# Patient Record
Sex: MALE | Race: WHITE | NOT HISPANIC OR LATINO | Employment: FULL TIME | URBAN - NONMETROPOLITAN AREA
[De-identification: names, ages, dates, MRNs, and addresses within clinical notes are randomized per-mention and may not be internally consistent; named-entity substitution may affect disease eponyms.]

---

## 2023-04-10 ENCOUNTER — HOSPITAL ENCOUNTER (EMERGENCY)
Facility: HOSPITAL | Age: 48
Discharge: HOME | End: 2023-04-10

## 2023-04-10 VITALS
WEIGHT: 146 LBS | SYSTOLIC BLOOD PRESSURE: 147 MMHG | TEMPERATURE: 97 F | RESPIRATION RATE: 18 BRPM | OXYGEN SATURATION: 100 % | HEIGHT: 69 IN | BODY MASS INDEX: 21.62 KG/M2 | HEART RATE: 61 BPM | DIASTOLIC BLOOD PRESSURE: 86 MMHG

## 2023-04-10 DIAGNOSIS — L23.7 ALLERGIC CONTACT DERMATITIS DUE TO PLANTS, EXCEPT FOOD: Primary | ICD-10-CM

## 2023-04-10 PROCEDURE — 99284 EMERGENCY DEPT VISIT MOD MDM: CPT

## 2023-04-10 RX ORDER — METHYLPREDNISOLONE 4 MG/1
TABLET ORAL
Qty: 21 EACH | Refills: 0 | Status: SHIPPED | OUTPATIENT
Start: 2023-04-10 | End: 2023-05-01

## 2023-04-10 RX ORDER — HYDROXYZINE HYDROCHLORIDE 25 MG/1
25 TABLET, FILM COATED ORAL EVERY 6 HOURS
Qty: 12 TABLET | Refills: 0 | Status: SHIPPED | OUTPATIENT
Start: 2023-04-10

## 2023-04-10 NOTE — ED PROVIDER NOTES
Encounter Date: 4/10/2023       History     Chief Complaint   Patient presents with    Rash     AMB TO ED WITH C/O RED RASH/BLISTERS TO RIGHT ARM. PT WAS UNDER  A HOUSE ON Friday. HE DEVELOPED THE RASH SHORTLY AFTERWARD. THE RASH IS NOW SWOLLEN AND STARTING TO BLISTER. HE THINKS THAT HE WAS IN CONTACT WITH SOME POISON IVY.     RASH ON UPPER EXTREMITIES X 3 DAYS WITH REDNESS AND OOZING AFTER EXPOSURE TO UNKNOWN PLANTS    Review of patient's allergies indicates:  No Known Allergies  History reviewed. No pertinent past medical history.  History reviewed. No pertinent surgical history.  History reviewed. No pertinent family history.  Social History     Tobacco Use    Smoking status: Every Day     Packs/day: 1.00     Types: Cigarettes    Smokeless tobacco: Never   Substance Use Topics    Alcohol use: Yes     Alcohol/week: 12.0 standard drinks     Types: 12 Cans of beer per week    Drug use: Never     Review of Systems   Skin:  Positive for rash.   All other systems reviewed and are negative.    Physical Exam     Initial Vitals [04/10/23 1345]   BP Pulse Resp Temp SpO2   (!) 149/90 66 20 97.4 °F (36.3 °C) 99 %      MAP       --         Physical Exam    Nursing note and vitals reviewed.  Constitutional: He appears well-developed and well-nourished.   HENT:   Head: Normocephalic and atraumatic.   Mouth/Throat: Mucous membranes are normal.   Eyes: EOM are normal. Pupils are equal, round, and reactive to light.   Neck: Neck supple.   Normal range of motion.  Cardiovascular:  Normal rate, regular rhythm and normal heart sounds.           Pulmonary/Chest: Breath sounds normal.   Musculoskeletal:         General: Normal range of motion.      Cervical back: Normal range of motion and neck supple.     Neurological: He is alert and oriented to person, place, and time. He has normal strength.   Skin: Skin is warm and dry. Capillary refill takes less than 2 seconds.   MODERATE VESICLES ON BILATERAL UPPER EXTREMITIES WITH CRUSTING AND  ERYTHEMA   Psychiatric: He has a normal mood and affect. His behavior is normal. Judgment and thought content normal.       ED Course   Procedures  Labs Reviewed - No data to display       Imaging Results    None          Medications - No data to display                           Clinical Impression:   Final diagnoses:  [L23.7] Allergic contact dermatitis due to plants, except food (Primary)        ED Disposition Condition    Discharge Stable          ED Prescriptions       Medication Sig Dispense Start Date End Date Auth. Provider    methylPREDNISolone (MEDROL DOSEPACK) 4 mg tablet use as directed 21 each 4/10/2023 5/1/2023 HONORIO Wells    hydrOXYzine HCL (ATARAX) 25 MG tablet Take 1 tablet (25 mg total) by mouth every 6 (six) hours. 12 tablet 4/10/2023 -- HONORIO Wells          Follow-up Information       Follow up With Specialties Details Why Contact Info    Ray Ignacio MD Psychiatry  As needed [address] [place] [address]  [phone]               HONORIO Wells  04/10/23 8843

## 2023-07-19 ENCOUNTER — HOSPITAL ENCOUNTER (EMERGENCY)
Facility: HOSPITAL | Age: 48
Discharge: HOME | End: 2023-07-19
Attending: FAMILY MEDICINE

## 2023-07-19 VITALS
DIASTOLIC BLOOD PRESSURE: 79 MMHG | SYSTOLIC BLOOD PRESSURE: 127 MMHG | BODY MASS INDEX: 21.47 KG/M2 | HEART RATE: 64 BPM | RESPIRATION RATE: 18 BRPM | OXYGEN SATURATION: 100 % | TEMPERATURE: 98 F | WEIGHT: 150 LBS | HEIGHT: 70 IN

## 2023-07-19 DIAGNOSIS — M70.42 PREPATELLAR BURSITIS OF LEFT KNEE: ICD-10-CM

## 2023-07-19 DIAGNOSIS — S80.02XA CONTUSION OF LEFT KNEE, INITIAL ENCOUNTER: Primary | ICD-10-CM

## 2023-07-19 DIAGNOSIS — L03.116 CELLULITIS OF LEFT KNEE: ICD-10-CM

## 2023-07-19 PROCEDURE — 99284 EMERGENCY DEPT VISIT MOD MDM: CPT

## 2023-07-19 PROCEDURE — 63600175 PHARM REV CODE 636 W HCPCS: Performed by: NURSE PRACTITIONER

## 2023-07-19 PROCEDURE — 96372 THER/PROPH/DIAG INJ SC/IM: CPT | Performed by: NURSE PRACTITIONER

## 2023-07-19 PROCEDURE — 25000003 PHARM REV CODE 250: Performed by: NURSE PRACTITIONER

## 2023-07-19 RX ORDER — MELOXICAM 15 MG/1
15 TABLET ORAL DAILY
Qty: 20 TABLET | Refills: 0 | Status: SHIPPED | OUTPATIENT
Start: 2023-07-19

## 2023-07-19 RX ORDER — CLINDAMYCIN HYDROCHLORIDE 150 MG/1
600 CAPSULE ORAL
Status: COMPLETED | OUTPATIENT
Start: 2023-07-19 | End: 2023-07-19

## 2023-07-19 RX ORDER — CLINDAMYCIN HYDROCHLORIDE 300 MG/1
300 CAPSULE ORAL EVERY 8 HOURS
Qty: 30 CAPSULE | Refills: 0 | Status: SHIPPED | OUTPATIENT
Start: 2023-07-19

## 2023-07-19 RX ORDER — KETOROLAC TROMETHAMINE 30 MG/ML
60 INJECTION, SOLUTION INTRAMUSCULAR; INTRAVENOUS
Status: COMPLETED | OUTPATIENT
Start: 2023-07-19 | End: 2023-07-19

## 2023-07-19 RX ADMIN — KETOROLAC TROMETHAMINE 60 MG: 30 INJECTION, SOLUTION INTRAMUSCULAR; INTRAVENOUS at 12:07

## 2023-07-19 RX ADMIN — CLINDAMYCIN HYDROCHLORIDE 600 MG: 150 CAPSULE ORAL at 12:07

## 2023-07-19 NOTE — ED PROVIDER NOTES
Encounter Date: 7/19/2023       History     Chief Complaint   Patient presents with    Knee Injury     Pt c/o left knee pain x4 days, reports he hit the side of knee on a boat trailer 4 days ago and pain is worse.  Pt has ace wrap to knee and crutches     Sree presents with left knee pain for 4 days after hitting his knee on a boat.       The history is provided by the patient.   Review of patient's allergies indicates:  No Known Allergies  History reviewed. No pertinent past medical history.  History reviewed. No pertinent surgical history.  No family history on file.  Social History     Tobacco Use    Smoking status: Every Day     Packs/day: 1.00     Types: Cigarettes    Smokeless tobacco: Never   Substance Use Topics    Alcohol use: Yes     Alcohol/week: 12.0 standard drinks     Types: 12 Cans of beer per week    Drug use: Never     Review of Systems   Constitutional:  Negative for fatigue.   Respiratory:  Negative for cough.    Cardiovascular:  Negative for chest pain.   Musculoskeletal:  Positive for arthralgias (left knee pain). Negative for back pain, gait problem, joint swelling, myalgias, neck pain and neck stiffness.   Hematological:  Does not bruise/bleed easily.     Physical Exam     Initial Vitals [07/19/23 1044]   BP Pulse Resp Temp SpO2   127/81 74 18 98.1 °F (36.7 °C) 99 %      MAP       --         Physical Exam    Nursing note and vitals reviewed.  Constitutional: Vital signs are normal. He appears well-developed and well-nourished. He does not appear ill. No distress.   HENT:   Head: Normocephalic and atraumatic.   Neck: Neck supple.   Normal range of motion.  Cardiovascular:  Normal rate and regular rhythm.           Pulmonary/Chest: Effort normal and breath sounds normal. No respiratory distress. He has no wheezes. He has no rhonchi. He has no rales.   Abdominal: There is no abdominal tenderness.   Musculoskeletal:         General: No edema.      Cervical back: Normal range of motion and neck  supple.      Left knee: Swelling and erythema present. No deformity or crepitus. Decreased range of motion. Tenderness present.      Comments: Left knee with some swelling, evidence of prepatellar bursitis also some erythema and warmth around the knee; no open wound      Neurological: He is alert and oriented to person, place, and time. He has normal strength.   Skin: Skin is warm, dry and intact.   Psychiatric: He has a normal mood and affect. His speech is normal and behavior is normal. Judgment and thought content normal. Cognition and memory are normal.       ED Course   Procedures  Labs Reviewed - No data to display       Imaging Results              X-Ray Knee 3 View Left (Final result)  Result time 07/19/23 11:19:55      Final result by Jose R Snider III, MD (07/19/23 11:19:55)                   Impression:      1. Focal soft tissue swelling is noted anterior to the distal half of the patellar tendon.  There is no evidence of acute fractures dislocations or a significant synovial effusion to suggest an injury involving the patellar tendon.      Electronically signed by: Jose R Snider  Date:    07/19/2023  Time:    11:19               Narrative:    EXAMINATION:  STUDY: XR KNEE 3 VIEW LEFT    CLINICAL HISTORY AND TECHNIQUE:  RT Jaylon on 7/19/2023 10:51 AM    CURRENT CLINICAL HISTORY: ER PT    X 4 DAYS    -LEFT KNEE PAIN AFTER HITTING IT ON BOAT TRAILER    -LIMITED ROM    PAST MEDICAL HISTORY: N/A    TECHNIQUE: 3 VIEWS OF LEFT KNEE    TECHNOLOGIST: DAVONTE/ROSY PONCE OK    COMPARISON:  None    FINDINGS:  Moderate focal soft tissue swelling is noted anterior to the lower half of the patella tendon.  I see no acute fractures, dislocations or significant synovial effusion.                                       Medications   clindamycin capsule 600 mg (has no administration in time range)   ketorolac injection 60 mg (has no administration in time range)                 ED Course as of 07/19/23 1216   Wed Jul  19, 2023   1206 X-Ray Knee 3 View Left  Focal soft tissue swelling is noted anterior to the distal half of the patellar tendon.  There is no evidence of acute fractures dislocations or a significant synovial effusion to suggest an injury involving the patellar tendon. [HB]      ED Course User Index  [HB] ROSA Tillman                 Clinical Impression:   Final diagnoses:  [S80.02XA] Contusion of left knee, initial encounter (Primary)  [L03.116] Cellulitis of left knee  [M70.42] Prepatellar bursitis of left knee        ED Disposition Condition    Discharge Stable          ED Prescriptions       Medication Sig Dispense Start Date End Date Auth. Provider    meloxicam (MOBIC) 15 MG tablet Take 1 tablet (15 mg total) by mouth once daily. 20 tablet 7/19/2023 -- HROSA Pelayo    clindamycin (CLEOCIN) 300 MG capsule Take 1 capsule (300 mg total) by mouth every 8 (eight) hours. 30 capsule 7/19/2023 -- HROSA Pelayo          Follow-up Information       Follow up With Specialties Details Why Contact Info    PCP  Call in 2 days As needed              ROSA Tillman  07/19/23 1218

## 2023-07-19 NOTE — Clinical Note
"Sree Cohen" Beatriz was seen and treated in our emergency department on 7/19/2023.  He may return to work on 07/20/2023.       If you have any questions or concerns, please don't hesitate to call.      calderon carcamo np/julián rosales rn RN    "

## 2023-07-19 NOTE — DISCHARGE INSTRUCTIONS
Rest, Ice, Compression, Elevation of the left knee  Meloxicam for pain and inflammation   Clindamycin for infection - first dose given in ER.  Take your next dose around 8PM tonight.   Return to ER with fever, chills, worsening pain.